# Patient Record
Sex: FEMALE | Race: WHITE | NOT HISPANIC OR LATINO | Employment: FULL TIME | URBAN - METROPOLITAN AREA
[De-identification: names, ages, dates, MRNs, and addresses within clinical notes are randomized per-mention and may not be internally consistent; named-entity substitution may affect disease eponyms.]

---

## 2019-10-17 ENCOUNTER — TRANSFERRED RECORDS (OUTPATIENT)
Dept: HEALTH INFORMATION MANAGEMENT | Facility: CLINIC | Age: 24
End: 2019-10-17

## 2020-11-06 ENCOUNTER — TRANSFERRED RECORDS (OUTPATIENT)
Dept: HEALTH INFORMATION MANAGEMENT | Facility: CLINIC | Age: 25
End: 2020-11-06

## 2021-08-31 ENCOUNTER — OFFICE VISIT (OUTPATIENT)
Dept: URGENT CARE | Facility: URGENT CARE | Age: 26
End: 2021-08-31
Payer: COMMERCIAL

## 2021-08-31 VITALS
TEMPERATURE: 98.7 F | OXYGEN SATURATION: 100 % | DIASTOLIC BLOOD PRESSURE: 80 MMHG | SYSTOLIC BLOOD PRESSURE: 143 MMHG | RESPIRATION RATE: 14 BRPM | HEART RATE: 78 BPM | WEIGHT: 145.2 LBS

## 2021-08-31 DIAGNOSIS — N93.9 VAGINAL SPOTTING: ICD-10-CM

## 2021-08-31 DIAGNOSIS — N89.8 VAGINAL DISCHARGE: Primary | ICD-10-CM

## 2021-08-31 DIAGNOSIS — N76.0 BACTERIAL VAGINITIS: ICD-10-CM

## 2021-08-31 DIAGNOSIS — B96.89 BACTERIAL VAGINITIS: ICD-10-CM

## 2021-08-31 LAB
ALBUMIN UR-MCNC: NEGATIVE MG/DL
APPEARANCE UR: CLEAR
BACTERIA #/AREA URNS HPF: ABNORMAL /HPF
BILIRUB UR QL STRIP: NEGATIVE
CLUE CELLS: PRESENT
COLOR UR AUTO: YELLOW
GLUCOSE UR STRIP-MCNC: NEGATIVE MG/DL
HCG UR QL: NEGATIVE
HGB UR QL STRIP: ABNORMAL
KETONES UR STRIP-MCNC: NEGATIVE MG/DL
LEUKOCYTE ESTERASE UR QL STRIP: NEGATIVE
NITRATE UR QL: NEGATIVE
PH UR STRIP: 6 [PH] (ref 5–7)
RBC #/AREA URNS AUTO: ABNORMAL /HPF
SP GR UR STRIP: >=1.03 (ref 1–1.03)
SQUAMOUS #/AREA URNS AUTO: ABNORMAL /LPF
TRICHOMONAS, WET PREP: ABNORMAL
UROBILINOGEN UR STRIP-ACNC: 0.2 E.U./DL
WBC #/AREA URNS AUTO: ABNORMAL /HPF
WBC'S/HIGH POWER FIELD, WET PREP: ABNORMAL
YEAST, WET PREP: ABNORMAL

## 2021-08-31 PROCEDURE — 81025 URINE PREGNANCY TEST: CPT | Performed by: NURSE PRACTITIONER

## 2021-08-31 PROCEDURE — 99203 OFFICE O/P NEW LOW 30 MIN: CPT | Performed by: NURSE PRACTITIONER

## 2021-08-31 PROCEDURE — 87210 SMEAR WET MOUNT SALINE/INK: CPT | Performed by: NURSE PRACTITIONER

## 2021-08-31 PROCEDURE — 81001 URINALYSIS AUTO W/SCOPE: CPT | Performed by: NURSE PRACTITIONER

## 2021-08-31 RX ORDER — LEVOTHYROXINE SODIUM 50 UG/1
50 TABLET ORAL
COMMUNITY
Start: 2021-07-28 | End: 2022-12-13

## 2021-08-31 RX ORDER — METRONIDAZOLE 500 MG/1
500 TABLET ORAL 2 TIMES DAILY
Qty: 14 TABLET | Refills: 0 | Status: SHIPPED | OUTPATIENT
Start: 2021-08-31 | End: 2021-09-07

## 2021-08-31 RX ORDER — NORGESTIMATE AND ETHINYL ESTRADIOL 7DAYSX3 28
0.04 KIT ORAL
COMMUNITY
Start: 2020-08-25

## 2021-08-31 RX ORDER — CITALOPRAM HYDROBROMIDE 20 MG/1
20 TABLET ORAL
COMMUNITY
Start: 2021-06-23 | End: 2024-01-15

## 2021-08-31 ASSESSMENT — ENCOUNTER SYMPTOMS
NAUSEA: 0
SHORTNESS OF BREATH: 0
COUGH: 0
SORE THROAT: 0
DIARRHEA: 0
RHINORRHEA: 0
CHILLS: 0
FEVER: 0
HEADACHES: 0
VOMITING: 0

## 2021-08-31 ASSESSMENT — PAIN SCALES - GENERAL: PAINLEVEL: MILD PAIN (2)

## 2021-08-31 NOTE — PROGRESS NOTES
SUBJECTIVE:   Mar Flores is a 25 year old female presenting with a chief complaint of   Chief Complaint   Patient presents with     Vaginal Problem     Patient has been having discharge, odor and has been having some bleeding after intrercourse. Has been ongoing for the past 2 weeks.        She is a new patient of [facility].    GYN Complaint    Onset of symptoms was 22 week(s) ago.  Course of illness is worsening.    Severity moderate  Current and Associated symptoms: vaginal discharge described as white and malodorous  Treatment measures tried include:  None  Sexually active: yes, single partner, contraception - mini pill / progesterone only pill  Predisposing factors: None  Hx of previous symptom: none    Had vaginal bleeding after sex.  And she has been having spotting.      Review of Systems   Constitutional: Negative for chills and fever.   HENT: Negative for congestion, ear pain, rhinorrhea and sore throat.    Respiratory: Negative for cough and shortness of breath.    Gastrointestinal: Negative for diarrhea, nausea and vomiting.   Genitourinary: Positive for vaginal bleeding and vaginal discharge.   Neurological: Negative for headaches.   All other systems reviewed and are negative.      No past medical history on file.  No family history on file.  Current Outpatient Medications   Medication Sig Dispense Refill     citalopram (CELEXA) 20 MG tablet Take 20 mg by mouth       levothyroxine (SYNTHROID/LEVOTHROID) 50 MCG tablet Take 50 mcg by mouth       metroNIDAZOLE (FLAGYL) 500 MG tablet Take 1 tablet (500 mg) by mouth 2 times daily for 7 days 14 tablet 0     norgestim-eth estrad triphasic (ORTHO TRI-CYCLEN) 0.18/0.215/0.25 MG-35 MCG tablet Take 0.035 mg by mouth       Social History     Tobacco Use     Smoking status: Never Smoker     Smokeless tobacco: Never Used   Substance Use Topics     Alcohol use: Not on file       OBJECTIVE  BP (!) 143/80   Pulse 78   Temp 98.7  F (37.1  C) (Tympanic)   Resp 14    Wt 65.9 kg (145 lb 3.2 oz)   SpO2 100%     Physical Exam  Vitals and nursing note reviewed.   Constitutional:       General: She is not in acute distress.     Appearance: She is well-developed. She is not diaphoretic.   HENT:      Head: Normocephalic and atraumatic.      Right Ear: Tympanic membrane and external ear normal.      Left Ear: Tympanic membrane and external ear normal.   Eyes:      Pupils: Pupils are equal, round, and reactive to light.   Pulmonary:      Effort: Pulmonary effort is normal. No respiratory distress.      Breath sounds: Normal breath sounds.   Abdominal:      Tenderness: There is no right CVA tenderness or left CVA tenderness.   Musculoskeletal:      Cervical back: Normal range of motion and neck supple.   Lymphadenopathy:      Cervical: No cervical adenopathy.   Skin:     General: Skin is warm and dry.   Neurological:      Mental Status: She is alert.      Cranial Nerves: No cranial nerve deficit.         Labs:  Results for orders placed or performed in visit on 08/31/21 (from the past 24 hour(s))   UA Macro with Reflex to Micro and Culture - lab collect    Specimen: Urine, Clean Catch   Result Value Ref Range    Color Urine Yellow Colorless, Straw, Light Yellow, Yellow    Appearance Urine Clear Clear    Glucose Urine Negative Negative mg/dL    Bilirubin Urine Negative Negative    Ketones Urine Negative Negative mg/dL    Specific Gravity Urine >=1.030 1.003 - 1.035    Blood Urine Large (A) Negative    pH Urine 6.0 5.0 - 7.0    Protein Albumin Urine Negative Negative mg/dL    Urobilinogen Urine 0.2 0.2, 1.0 E.U./dL    Nitrite Urine Negative Negative    Leukocyte Esterase Urine Negative Negative   Wet prep - lab collect    Specimen: Vagina; Swab   Result Value Ref Range    Trichomonas Absent Absent    Yeast Absent Absent    Clue Cells Present (A) Absent    WBCs/high power field 1+ (A) None   Urine Microscopic   Result Value Ref Range    Bacteria Urine Many (A) None Seen /HPF    RBC Urine  5-10 (A) 0-2 /HPF /HPF    WBC Urine 0-5 0-5 /HPF /HPF    Squamous Epithelials Urine Few (A) None Seen /LPF    Narrative    Urine Culture not indicated   ASSESSMENT:      ICD-10-CM    1. Vaginal discharge  N89.8 UA Macro with Reflex to Micro and Culture - lab collect     Wet prep - lab collect     UA Macro with Reflex to Micro and Culture - lab collect     Wet prep - lab collect     Urine Microscopic   2. Bacterial vaginitis  N76.0 metroNIDAZOLE (FLAGYL) 500 MG tablet    B96.89    3. Vaginal spotting  N93.9 HCG qualitative urine        PLAN:  I discussed lab results with the patient.Patient educational/instructional material provided including reasons for follow-up    The patient indicates understanding of these issues and agrees with the plan.              Patient Instructions     Patient Education     Bacterial Vaginosis    You have a vaginal infection called bacterial vaginosis (BV). Both good and bad bacteria are present in a healthy vagina. BV occurs when these bacteria get out of balance. The number of bad bacteria increase. And the number of good bacteria decrease. BV is linked with sexual activity, but it's not a sexually transmitted infection (STI).   BV may or may not cause symptoms. If symptoms do occur, they can include:     Thin, gray, milky-white, or sometimes green discharge    Unpleasant odor or  fishy  smell    Itching, burning, or pain in or around the vagina  It is not known what causes BV, but certain factors can make the problem more likely. These can include:     Douching    Spermicides    Use of antibiotics    Change in hormone levels with pregnancy, breastfeeding, or menopause    Having sex with a new partner    Having sex with more than one partner  BV will sometimes go away on its own. But treatment is often advised. This is because untreated BV can raise the risk of more serious health problems such as:     Pelvic inflammatory disease (PID)     delivery (giving birth to a baby early  if you re pregnant)    HIV and some other sexually transmitted infections (STIs)    Infection after surgery on the reproductive organs  Home care  General care    BV is most often treated with medicines called antibiotics. These may be given as pills or as a vaginal cream. If antibiotics are prescribed, be sure to use them exactly as directed. And complete all of the medicine, even if your symptoms go away.    Don't douche or having sex during treatment.    If you have sex with a female partner, ask your healthcare provider if she should also be treated.  Prevention    Don't douche.    Don't have sex. If you do have sex, then take steps to lower your risk:  ? Use condoms when having sex.  ? Limit the number of sex partners you have.    Follow-up care  Follow up with your healthcare provider, or as advised.   When to get medical advice  Call your healthcare provider right away if:     You have a fever of 100.4 F (38 C) or higher, or as directed by your provider.    Your symptoms get worse, or they don t go away within a few days of starting treatment.    You have new pain in the lower belly or pelvic region.    You have side effects that bother you or a reaction to the pills or cream you re prescribed.    You or any of your sex partners have new symptoms, such as a rash, joint pain, or sores.  [employer] last reviewed this educational content on 6/1/2020 2000-2021 [employer]. All rights reserved. This information is not intended as a substitute for professional medical care. Always follow your healthcare professional's instructions.

## 2021-09-01 NOTE — PATIENT INSTRUCTIONS
Patient Education     Bacterial Vaginosis    You have a vaginal infection called bacterial vaginosis (BV). Both good and bad bacteria are present in a healthy vagina. BV occurs when these bacteria get out of balance. The number of bad bacteria increase. And the number of good bacteria decrease. BV is linked with sexual activity, but it's not a sexually transmitted infection (STI).   BV may or may not cause symptoms. If symptoms do occur, they can include:     Thin, gray, milky-white, or sometimes green discharge    Unpleasant odor or  fishy  smell    Itching, burning, or pain in or around the vagina  It is not known what causes BV, but certain factors can make the problem more likely. These can include:     Douching    Spermicides    Use of antibiotics    Change in hormone levels with pregnancy, breastfeeding, or menopause    Having sex with a new partner    Having sex with more than one partner  BV will sometimes go away on its own. But treatment is often advised. This is because untreated BV can raise the risk of more serious health problems such as:     Pelvic inflammatory disease (PID)     delivery (giving birth to a baby early if you re pregnant)    HIV and some other sexually transmitted infections (STIs)    Infection after surgery on the reproductive organs  Home care  General care    BV is most often treated with medicines called antibiotics. These may be given as pills or as a vaginal cream. If antibiotics are prescribed, be sure to use them exactly as directed. And complete all of the medicine, even if your symptoms go away.    Don't douche or having sex during treatment.    If you have sex with a female partner, ask your healthcare provider if she should also be treated.  Prevention    Don't douche.    Don't have sex. If you do have sex, then take steps to lower your risk:  ? Use condoms when having sex.  ? Limit the number of sex partners you have.    Follow-up care  Follow up with your  healthcare provider, or as advised.   When to get medical advice  Call your healthcare provider right away if:     You have a fever of 100.4 F (38 C) or higher, or as directed by your provider.    Your symptoms get worse, or they don t go away within a few days of starting treatment.    You have new pain in the lower belly or pelvic region.    You have side effects that bother you or a reaction to the pills or cream you re prescribed.    You or any of your sex partners have new symptoms, such as a rash, joint pain, or sores.  [employer] last reviewed this educational content on 6/1/2020 2000-2021 [employer]. All rights reserved. This information is not intended as a substitute for professional medical care. Always follow your healthcare professional's instructions.

## 2022-08-08 ENCOUNTER — TELEPHONE (OUTPATIENT)
Dept: ENDOCRINOLOGY | Facility: CLINIC | Age: 27
End: 2022-08-08

## 2022-08-08 ENCOUNTER — MEDICAL CORRESPONDENCE (OUTPATIENT)
Dept: HEALTH INFORMATION MANAGEMENT | Facility: CLINIC | Age: 27
End: 2022-08-08

## 2022-08-08 NOTE — TELEPHONE ENCOUNTER
"I received an \"outside messages\" message today from a physician requesting an endocrinology new patient consult for hypothyroidism.  I don't know why it was sent directly to me.  Please offer patient the first available endocrinology new patient appointment, with whichever provider available.  Thanks.    ESTHER March MD, MS  Endocrinology  [facility]        "

## 2022-08-09 ENCOUNTER — TRANSCRIBE ORDERS (OUTPATIENT)
Dept: OTHER | Age: 27
End: 2022-08-09

## 2022-08-09 DIAGNOSIS — E06.9 THYROIDITIS: Primary | ICD-10-CM

## 2022-08-09 DIAGNOSIS — E06.3 HYPOTHYROIDISM DUE TO HASHIMOTO'S THYROIDITIS: ICD-10-CM

## 2022-12-13 ENCOUNTER — VIRTUAL VISIT (OUTPATIENT)
Dept: ENDOCRINOLOGY | Facility: CLINIC | Age: 27
End: 2022-12-13
Payer: COMMERCIAL

## 2022-12-13 DIAGNOSIS — E06.3 HYPOTHYROIDISM DUE TO HASHIMOTO'S THYROIDITIS: ICD-10-CM

## 2022-12-13 PROCEDURE — 99204 OFFICE O/P NEW MOD 45 MIN: CPT | Mod: 95 | Performed by: INTERNAL MEDICINE

## 2022-12-13 RX ORDER — LEVOTHYROXINE SODIUM 50 UG/1
50 TABLET ORAL DAILY
Qty: 30 TABLET | Refills: 0 | Status: SHIPPED | OUTPATIENT
Start: 2022-12-13 | End: 2022-12-24

## 2022-12-13 NOTE — LETTER
12/13/2022         RE: Mar Flores  [address]  [facility] [address]        Dear Colleague,    Thank you for referring your patient, Mar Flores, to the [facility] [place]. Please see a copy of my visit note below.    Patient is being evaluated via a billable video visit.      How would you like to obtain your AVS? Verbally Reviewed  If the video visit is dropped, the invitation should be resent by: Cellphone  Will anyone else be joining your video visit? No        Video-Visit Details    Video Start Time: 2:07 pm    Type of service:  Video Visit    Video End Time:  2:28 pm    Originating Location (pt. Location):  Home    PROVIDER LOCATION On-site vs Off-site    Distant Location (provider location):  Home    Platform used for Video Visit: [email or web address]          Name: Mar Flores is a 27 year old woman, referred by Dr. Abdirizak Up for evaluation of     Chief Complaint   Patient presents with     Thyroid Problem       HPI:  Recent issues:  Here for evaluation of hypothyroidism problem  Reviewed medical history from patient and [facility] chart record        10/2015. Initial diagnosis thyroid problem while in high school  Symptoms of joint pains, wt gain, hair thinning  She avoided gluten foods but symptoms persisted  Patient recalls lab tests revealed abnormal thyroid levels  Thyroid U/S reportedly showed increased vascularity pattern, TgAb positive and TPOAb negative  Began levothyroxine medication taking levothyroxine 0.05 mg daily    Previous endocrinology evaluations included [facility]va, and [facility] in [place], [place]  2018. Moved from [facility] to [facility]    Additional health history:  Neck radiation treatment:  Fam Hx thyroid disease: [place]- hyperthyroidism, cousin- hypothyroidism    Previous  labs include:      Previous [facility] labs include:      Current dose:  Levothyroxine 0.05 mg daily    Other chronic illnesses include:   Asthma:    Followed by PCP   Depression:    Followed by PCP   Left hip pain:  Followed by PCP      Lives in [place], [place]  Sees Dr. Zena Luther/[facility] for general medicine evaluations.    PMH/PSH:  Past Medical History:   Diagnosis Date     Acne      Anxiety      Depression      Exercise-induced asthma      Gluten intolerance      Hip pain, left      Thyroiditis      No past surgical history on file.    Family Hx:  No family history on file.      Social Hx:  Social History     Socioeconomic History     Marital status: Single     Spouse name: Not on file     Number of children: Not on file     Years of education: Not on file     Highest education level: Not on file   Occupational History     Not on file   Tobacco Use     Smoking status: Never     Smokeless tobacco: Never   Substance and Sexual Activity     Alcohol use: Not on file     Drug use: Not on file     Sexual activity: Not on file   Other Topics Concern     Not on file   Social History Narrative     Not on file     Social Determinants of Health     Financial Resource Strain: Not on file   Food Insecurity: Not on file   Transportation Needs: Not on file   Physical Activity: Not on file   Stress: Not on file   Social Connections: Not on file   Intimate Partner Violence: Not on file   Housing Stability: Not on file          MEDICATIONS:  has a current medication list which includes the following prescription(s): citalopram, levothyroxine, and norgestim-eth estrad triphasic.    ROS:     ROS: 10 point ROS neg other than the symptoms noted above in the HPI.    GENERAL: no fatigue, wt stable; denies fevers, chills, night sweats.   HEENT: no dysphagia, odonophagia, diplopia, neck pain  THYROID:  no apparent hyper or hypothyroid symptoms  CV: no chest pain, pressure, palpitations  LUNGS: no SOB, ZIMMER, cough, wheezing   ABDOMEN: occasional indigestion; no diarrhea, constipation, abdominal pain  EXTREMITIES: no rashes, ulcers, edema  NEUROLOGY: no  headaches, denies changes in vision, tingling, extremitiy numbness   MSK: no muscle aches or pains, weakness  SKIN: no rashes or lesions  : menses regular, recently stopped OCP medication 11/2022  PSYCH:  stable mood, no significant anxiety or depression  ENDOCRINE: no heat or cold intolerance    Physical Exam (visual exam)  VS:  no vital signs taken for video visit  CONSTITUTIONAL: healthy, alert and NAD, well dressed, answering questions appropriately  ENT: no nose swelling or nasal discharge, mouth redness or gum changes.  EYES: eyes grossly normal to inspection, conjunctivae and sclerae normal, no exophthalmos or proptosis  THYROID:  no apparent nodules or goiter  LUNGS: no audible wheeze, cough or visible cyanosis, no visible retractions or increased work of breathing  ABDOMEN: abdomen not evaluated  EXTREMITIES: no hand tremors, limited exam  NEUROLOGY: CN grossly intact, mentation intact and speech normal   SKIN:  no apparent skin lesions, rash, or edema with visualized skin appearance  PSYCH: mentation appears normal, affect normal/bright, judgement and insight intact,   normal speech and appearance well groomed      LABS:    All pertinent notes, labs, and images personally reviewed by me.     A/P:  Encounter Diagnosis   Name Primary?     Hypothyroidism due to Hashimoto's thyroiditis        Comments:  Reviewed health history and thyroid issues.  History indicates elevated TgAb though normal TPOAb, probable autoimmune Hashimoto's syndrome    Plan:  Reviewed general issues with the hypothyroidism diagnosis and management  Discussed lab tests used to assess patient thyroid hormone levels  Reviewed thyroid medication treatment with levothyroxine medication, dosing.    Recommend:  Continue current levothyroxine 0.05 mg daily dose at this time  Monitor for symptom changes  Arrange repeat thyroid lab tests soon   Discussed options at [facility]   Consider having new PCP order tests and results  sent here   Lab orders placed  No thyroid U/S imaging needed at this time  Keep focus on diet, exercise, and weight management  Reviewed general issues for thyroid lab testing and levothyroxine treatment if Pg  Contact me if questions about this management plan     Discussed importance of having a primary care practitioner (FP or Int Med) for general medicine appointments and also acute care visits.  Addressed patient questions today     There are no Patient Instructions on file for this visit.    Future labs ordered today:   Orders Placed This Encounter   Procedures     TSH     T4 free     Thyroid peroxidase antibody     Radiology/Consults ordered today:     Total time spent on day of encounter:  45 min    Follow-up:  12/2023    ESTHER March MD, MS  Endocrinology  [facility]    CC: Dr. Abdirizak Up/[facility]      Again, thank you for allowing me to participate in the care of your patient.        Sincerely,        Yomi March MD

## 2022-12-13 NOTE — PROGRESS NOTES
Patient is being evaluated via a billable video visit.      How would you like to obtain your AVS? Verbally Reviewed  If the video visit is dropped, the invitation should be resent by: Cellphone  Will anyone else be joining your video visit? No        Video-Visit Details    Video Start Time: 2:07 pm    Type of service:  Video Visit    Video End Time:  2:28 pm    Originating Location (pt. Location):  Home    PROVIDER LOCATION On-site vs Off-site    Distant Location (provider location):  Home    Platform used for Video Visit: [facility]          Name: Mar Flores is a 27 year old woman, referred by Dr. Abdirizak Up for evaluation of     Chief Complaint   Patient presents with     Thyroid Problem       HPI:  Recent issues:  Here for evaluation of hypothyroidism problem  Reviewed medical history from patient and Epic chart record        10/2015. Initial diagnosis thyroid problem while in high school  Symptoms of joint pains, wt gain, hair thinning  She avoided gluten foods but symptoms persisted  Patient recalls lab tests revealed abnormal thyroid levels  Thyroid U/S reportedly showed increased vascularity pattern, TgAb positive and TPOAb negative  Began levothyroxine medication taking levothyroxine 0.05 mg daily    Previous endocrinology evaluations included Lashon Alva, and Jeovanny in [place], [place]  2018. Moved from [facility] to [facility]    Additional health history:  Neck radiation treatment:  Fam Hx thyroid disease: MA- hyperthyroidism, cousin- hypothyroidism    Previous  labs include:      Previous [facility] labs include:      Current dose:  Levothyroxine 0.05 mg daily    Other chronic illnesses include:   Asthma:   Followed by PCP   Depression:    Followed by PCP   Left hip pain:  Followed by PCP      Lives in [place], [place]  Sees Dr. Zena Luther/[facility] for general medicine evaluations.    PMH/PSH:  Past Medical History:   Diagnosis Date     Acne       Anxiety      Depression      Exercise-induced asthma      Gluten intolerance      Hip pain, left      Thyroiditis      No past surgical history on file.    Family Hx:  No family history on file.      Social Hx:  Social History     Socioeconomic History     Marital status: Single     Spouse name: Not on file     Number of children: Not on file     Years of education: Not on file     Highest education level: Not on file   Occupational History     Not on file   Tobacco Use     Smoking status: Never     Smokeless tobacco: Never   Substance and Sexual Activity     Alcohol use: Not on file     Drug use: Not on file     Sexual activity: Not on file   Other Topics Concern     Not on file   Social History Narrative     Not on file     Social Determinants of Health     Financial Resource Strain: Not on file   Food Insecurity: Not on file   Transportation Needs: Not on file   Physical Activity: Not on file   Stress: Not on file   Social Connections: Not on file   Intimate Partner Violence: Not on file   Housing Stability: Not on file          MEDICATIONS:  has a current medication list which includes the following prescription(s): citalopram, levothyroxine, and norgestim-eth estrad triphasic.    ROS:     ROS: 10 point ROS neg other than the symptoms noted above in the HPI.    GENERAL: no fatigue, wt stable; denies fevers, chills, night sweats.   HEENT: no dysphagia, odonophagia, diplopia, neck pain  THYROID:  no apparent hyper or hypothyroid symptoms  CV: no chest pain, pressure, palpitations  LUNGS: no SOB, ZIMMER, cough, wheezing   ABDOMEN: occasional indigestion; no diarrhea, constipation, abdominal pain  EXTREMITIES: no rashes, ulcers, edema  NEUROLOGY: no headaches, denies changes in vision, tingling, extremitiy numbness   MSK: no muscle aches or pains, weakness  SKIN: no rashes or lesions  : menses regular, recently stopped OCP medication 11/2022  PSYCH:  stable mood, no significant anxiety or depression  ENDOCRINE:  no heat or cold intolerance    Physical Exam (visual exam)  VS:  no vital signs taken for video visit  CONSTITUTIONAL: healthy, alert and NAD, well dressed, answering questions appropriately  ENT: no nose swelling or nasal discharge, mouth redness or gum changes.  EYES: eyes grossly normal to inspection, conjunctivae and sclerae normal, no exophthalmos or proptosis  THYROID:  no apparent nodules or goiter  LUNGS: no audible wheeze, cough or visible cyanosis, no visible retractions or increased work of breathing  ABDOMEN: abdomen not evaluated  EXTREMITIES: no hand tremors, limited exam  NEUROLOGY: CN grossly intact, mentation intact and speech normal   SKIN:  no apparent skin lesions, rash, or edema with visualized skin appearance  PSYCH: mentation appears normal, affect normal/bright, judgement and insight intact,   normal speech and appearance well groomed      LABS:    All pertinent notes, labs, and images personally reviewed by me.     A/P:  Encounter Diagnosis   Name Primary?     Hypothyroidism due to Hashimoto's thyroiditis        Comments:  Reviewed health history and thyroid issues.  History indicates elevated TgAb though normal TPOAb, probable autoimmune Hashimoto's syndrome    Plan:  Reviewed general issues with the hypothyroidism diagnosis and management  Discussed lab tests used to assess patient thyroid hormone levels  Reviewed thyroid medication treatment with levothyroxine medication, dosing.    Recommend:  Continue current levothyroxine 0.05 mg daily dose at this time  Monitor for symptom changes  Arrange repeat thyroid lab tests soon   Discussed options at [facility] [place] clinics   Consider having new PCP order tests and results sent here   Lab orders placed  No thyroid U/S imaging needed at this time  Keep focus on diet, exercise, and weight management  Reviewed general issues for thyroid lab testing and levothyroxine treatment if Pg  Contact me if questions about this management plan      Discussed importance of having a primary care practitioner (FP or Int Med) for general medicine appointments and also acute care visits.  Addressed patient questions today     There are no Patient Instructions on file for this visit.    Future labs ordered today:   Orders Placed This Encounter   Procedures     TSH     T4 free     Thyroid peroxidase antibody     Radiology/Consults ordered today:     Total time spent on day of encounter:  45 min    Follow-up:  12/2023    ESTHER March MD, MS  Endocrinology  [facility]    CC: Dr. Abdirizak Up/[facility]

## 2022-12-17 ENCOUNTER — LAB (OUTPATIENT)
Dept: LAB | Facility: CLINIC | Age: 27
End: 2022-12-17
Payer: COMMERCIAL

## 2022-12-17 DIAGNOSIS — E06.3 HYPOTHYROIDISM DUE TO HASHIMOTO'S THYROIDITIS: ICD-10-CM

## 2022-12-17 LAB
T4 FREE SERPL-MCNC: 1.36 NG/DL (ref 0.9–1.7)
TSH SERPL DL<=0.005 MIU/L-ACNC: 2.17 UIU/ML (ref 0.3–4.2)

## 2022-12-17 PROCEDURE — 86376 MICROSOMAL ANTIBODY EACH: CPT

## 2022-12-17 PROCEDURE — 84439 ASSAY OF FREE THYROXINE: CPT

## 2022-12-17 PROCEDURE — 36415 COLL VENOUS BLD VENIPUNCTURE: CPT

## 2022-12-17 PROCEDURE — 84443 ASSAY THYROID STIM HORMONE: CPT

## 2022-12-19 LAB — THYROPEROXIDASE AB SERPL-ACNC: <10 IU/ML

## 2022-12-24 DIAGNOSIS — E06.3 HYPOTHYROIDISM DUE TO HASHIMOTO'S THYROIDITIS: ICD-10-CM

## 2022-12-24 RX ORDER — LEVOTHYROXINE SODIUM 50 UG/1
50 TABLET ORAL DAILY
Qty: 90 TABLET | Refills: 3 | Status: SHIPPED | OUTPATIENT
Start: 2022-12-24 | End: 2024-01-15

## 2023-02-11 ENCOUNTER — HEALTH MAINTENANCE LETTER (OUTPATIENT)
Age: 28
End: 2023-02-11

## 2023-05-20 ENCOUNTER — HEALTH MAINTENANCE LETTER (OUTPATIENT)
Age: 28
End: 2023-05-20

## 2024-01-15 ENCOUNTER — VIRTUAL VISIT (OUTPATIENT)
Dept: ENDOCRINOLOGY | Facility: CLINIC | Age: 29
End: 2024-01-15
Payer: COMMERCIAL

## 2024-01-15 DIAGNOSIS — E06.3 HYPOTHYROIDISM DUE TO HASHIMOTO'S THYROIDITIS: Primary | ICD-10-CM

## 2024-01-15 PROCEDURE — 99213 OFFICE O/P EST LOW 20 MIN: CPT | Mod: 95 | Performed by: INTERNAL MEDICINE

## 2024-01-15 RX ORDER — LEVOTHYROXINE SODIUM 50 UG/1
50 TABLET ORAL DAILY
Qty: 90 TABLET | Refills: 0 | Status: SHIPPED | OUTPATIENT
Start: 2024-01-15 | End: 2024-01-19

## 2024-01-15 NOTE — Clinical Note
"    1/15/2024         RE: Mar Wei  [address] [address]        Dear Colleague,    Thank you for referring your patient, Mar Wei, to the [facility] [place]. Please see a copy of my visit note below.    Virtual Visit Details    Type of service:  Video Visit     Originating Location (pt. Location): {video visit patient location:347036::\"Home\"}  {PROVIDER LOCATION On-site should be selected for visits conducted from your clinic location or adjoining [facility] hospital, academic office, or other nearby [facility] building. Off-site should be selected for all other provider locations, including home:224895}  Distant Location (provider location):  {virtual location provider:037224}  Platform used for Video Visit: {Virtual Visit Platforms:855381::\"Planex\"}      Recent issues:  Hypothyroidism followup evaluation, last/first appt with me 12/13/22  Reviewed medical history from patient and Epic chart record        10/2015. Initial diagnosis thyroid problem while in high school  Symptoms of joint pains, wt gain, hair thinning  She avoided gluten foods but symptoms persisted  Patient recalls lab tests revealed abnormal thyroid levels  Thyroid U/S reportedly showed increased vascularity pattern, TgAb positive and TPOAb negative  Began levothyroxine medication taking levothyroxine 0.05 mg daily    Previous endocrinology evaluations included Lashon Alva, and Jeovanny in [place], [place]  2018. Moved from [facility] to [facility]    Additional health history:  Neck radiation treatment:  Fam Hx thyroid disease: MA- hyperthyroidism, cousin- hypothyroidism    Previous  labs include:      Previous [facility] labs include:          12/13/22. Initial endocrinology evaluation with me at [place]  Reviewed health history and thyroid issues.  Continued levothyroxine medication treatment  Recent [facility] labs include:  Lab Results   Component Value Date    TSH 2.17 12/17/2022    T4 1.36 " 12/17/2022    TPO <10 12/17/2022     Current dose:  Levothyroxine 0.05 mg daily          Lives in [place], [place]  Sees Dr. Zena Luther/[facility] for general medicine evaluations.    PMH/PSH:  Past Medical History:   Diagnosis Date    Acne     Anxiety     Depression     Exercise-induced asthma     Gluten intolerance     Hip pain, left     Thyroiditis      No past surgical history on file.    Family Hx:  No family history on file.      Social Hx:  Social History     Socioeconomic History    Marital status: Single     Spouse name: Not on file    Number of children: Not on file    Years of education: Not on file    Highest education level: Not on file   Occupational History    Not on file   Tobacco Use    Smoking status: Never    Smokeless tobacco: Never   Substance and Sexual Activity    Alcohol use: Not on file    Drug use: Not on file    Sexual activity: Not on file   Other Topics Concern    Not on file   Social History Narrative    Not on file     Social Determinants of Health     Financial Resource Strain: Not on file   Food Insecurity: Not on file   Transportation Needs: Not on file   Physical Activity: Not on file   Stress: Not on file   Social Connections: Not on file   Interpersonal Safety: Not on file   Housing Stability: Not on file          MEDICATIONS:  has a current medication list which includes the following prescription(s): citalopram, levothyroxine, and norgestim-eth estrad triphasic.    ROS:     ROS: 10 point ROS neg other than the symptoms noted above in the HPI.    GENERAL: no fatigue, wt stable; denies fevers, chills, night sweats.   HEENT: no dysphagia, odonophagia, diplopia, neck pain  THYROID:  no apparent hyper or hypothyroid symptoms  CV: no chest pain, pressure, palpitations  LUNGS: no SOB, ZIMMER, cough, wheezing   ABDOMEN: occasional indigestion; no diarrhea, constipation, abdominal pain  EXTREMITIES: no rashes, ulcers, edema  NEUROLOGY: no headaches, denies changes in  vision, tingling, extremitiy numbness   MSK: no muscle aches or pains, weakness  SKIN: no rashes or lesions  : menses regular, recently stopped OCP medication 11/2022  PSYCH:  stable mood, no significant anxiety or depression  ENDOCRINE: no heat or cold intolerance    Physical Exam (visual exam)  VS:  no vital signs taken for video visit  CONSTITUTIONAL: healthy, alert and NAD, well dressed, answering questions appropriately  ENT: no nose swelling or nasal discharge, mouth redness or gum changes.  EYES: eyes grossly normal to inspection, conjunctivae and sclerae normal, no exophthalmos or proptosis  THYROID:  no apparent nodules or goiter  LUNGS: no audible wheeze, cough or visible cyanosis, no visible retractions or increased work of breathing  ABDOMEN: abdomen not evaluated  EXTREMITIES: no hand tremors, limited exam  NEUROLOGY: CN grossly intact, mentation intact and speech normal   SKIN:  no apparent skin lesions, rash, or edema with visualized skin appearance  PSYCH: mentation appears normal, affect normal/bright, judgement and insight intact,   normal speech and appearance well groomed      LABS:    All pertinent notes, labs, and images personally reviewed by me.     A/P:  No diagnosis found.      Comments:  Reviewed health history and thyroid issues.  History indicates elevated TgAb though normal TPOAb, probable autoimmune Hashimoto's syndrome    Plan:  Reviewed general issues with the hypothyroidism diagnosis and management  Discussed lab tests used to assess patient thyroid hormone levels  Reviewed thyroid medication treatment with levothyroxine medication, dosing.    Recommend:  Continue current levothyroxine 0.05 mg daily dose at this time  Monitor for symptom changes  Arrange repeat thyroid lab tests soon   Discussed options at [facility] [place] [place] clinics   Consider having new PCP order tests and results sent here   Lab orders placed  No thyroid U/S imaging needed at this time  Keep focus on diet,  exercise, and weight management  Reviewed general issues for thyroid lab testing and levothyroxine treatment if Pg  Contact me if questions about this management plan     Discussed importance of having a primary care practitioner (FP or Int Med) for general medicine appointments and also acute care visits.  Addressed patient questions today     There are no Patient Instructions on file for this visit.    Future labs ordered today:   No orders of the defined types were placed in this encounter.    Radiology/Consults ordered today:     Total time spent on day of encounter:  45 min    Follow-up:  12/2023    ESTHER March MD, MS  Endocrinology  [facility]    CC: Dr. Abdirizak Up/[facility]    Virtual Visit Details    Type of service:  Video Visit     Originating Location (pt. Location): Home  Distant Location (provider location):  Off-site  Platform used for Video Visit: [employer]      Recent issues:  Hypothyroidism followup evaluation, last/first appt with me 12/13/22  Feeling well overall, no new health issues reported.  Taking the levothyroxine medication regularly, but no recent thyroid lab testing  Reviewed medical history from patient and Epic chart record        10/2015. Initial diagnosis thyroid problem while in high school  Symptoms of joint pains, wt gain, hair thinning  She avoided gluten foods but symptoms persisted  Patient recalls lab tests revealed abnormal thyroid levels  Thyroid U/S reportedly showed increased vascularity pattern, TgAb positive and TPOAb negative  Began levothyroxine medication taking levothyroxine 0.05 mg daily    Previous endocrinology evaluations included Lashon Alva, and Jeovanny in [place], [place]  2018. Moved from [facility] to [facility]    Additional health history:  Neck radiation treatment:  Fam Hx thyroid disease: MA- hyperthyroidism, cousin- hypothyroidism    Previous [facility] labs include:      Previous [facility] labs  include:          12/13/22. Initial endocrinology evaluation with me at [place]  Reviewed health history and thyroid issues.  Continued levothyroxine medication treatment  Recent [facility] labs include:  Lab Results   Component Value Date    TSH 2.17 12/17/2022    T4 1.36 12/17/2022    TPO <10 12/17/2022     Current dose:  Levothyroxine 0.05 mg daily      Lives in [place], [place]  Sees Dr. Zena Luther/[facility] for general medicine evaluations.    PMH/PSH:  Past Medical History:   Diagnosis Date     Acne      Anxiety      Depression      Exercise-induced asthma      Gluten intolerance      Hip pain, left      Thyroiditis      No past surgical history on file.    Family Hx:  No family history on file.      Social Hx:  Social History     Socioeconomic History     Marital status:      Spouse name: Not on file     Number of children: Not on file     Years of education: Not on file     Highest education level: Not on file   Occupational History     Not on file   Tobacco Use     Smoking status: Never     Smokeless tobacco: Never   Substance and Sexual Activity     Alcohol use: Not on file     Drug use: Not on file     Sexual activity: Not on file   Other Topics Concern     Not on file   Social History Narrative     Not on file     Social Determinants of Health     Financial Resource Strain: Not on file   Food Insecurity: Not on file   Transportation Needs: Not on file   Physical Activity: Not on file   Stress: Not on file   Social Connections: Not on file   Interpersonal Safety: Not on file   Housing Stability: Not on file          MEDICATIONS:  has a current medication list which includes the following prescription(s): levothyroxine and norgestim-eth estrad triphasic.    ROS:     ROS: 10 point ROS neg other than the symptoms noted above in the HPI.    GENERAL: no fatigue, wt stable; denies fevers, chills, night sweats.   HEENT: no dysphagia, odonophagia, diplopia, neck pain  THYROID:  no apparent  hyper or hypothyroid symptoms  CV: rare heart racing; no chest pain, pressure, palpitations  LUNGS: no SOB, ZIMMER, cough, wheezing   ABDOMEN: no diarrhea, constipation, abdominal pain  EXTREMITIES: no rashes, ulcers, edema  NEUROLOGY: no headaches, denies changes in vision, tingling, extremitiy numbness   MSK: no muscle aches or pains, weakness  SKIN: no rashes or lesions  : menses fairly regular but lighter, on OCP medication  PSYCH:  stable mood, no significant anxiety or depression  ENDOCRINE: no heat or cold intolerance    Physical Exam (visual exam)  VS:  no vital signs taken for video visit  CONSTITUTIONAL: healthy, alert and NAD, well dressed, answering questions appropriately  ENT: no nose swelling or nasal discharge, mouth redness or gum changes.  EYES: eyes grossly normal to inspection, conjunctivae and sclerae normal, no exophthalmos or proptosis  THYROID:  no apparent nodules or goiter  LUNGS: no audible wheeze, cough or visible cyanosis, no visible retractions or increased work of breathing  ABDOMEN: abdomen not evaluated  EXTREMITIES: no hand tremors, limited exam  NEUROLOGY: CN grossly intact, mentation intact and speech normal   SKIN:  no apparent skin lesions, rash, or edema with visualized skin appearance  PSYCH: mentation appears normal, affect normal/bright, judgement and insight intact,   normal speech and appearance well groomed      LABS:    All pertinent notes, labs, and images personally reviewed by me.     A/P:  Encounter Diagnosis   Name Primary?     Hypothyroidism due to Hashimoto's thyroiditis Yes     Comments:  Reviewed health history and thyroid issues.  History indicates elevated TgAb though normal TPOAb, probable autoimmune Hashimoto's syndrome  Need repeat (TSH) thyroid test info  Reviewed and interpreted tests that I previously ordered.   Ordered appropriate tests for the endocrinology disease management.    Management options discussed and implemented after shared medical decision  making with the patient.  Thyroid problem is chronic-stable     Plan:  Reviewed general issues with the hypothyroidism diagnosis and management  Discussed lab tests used to assess patient thyroid hormone levels  Reviewed thyroid medication treatment with levothyroxine medication, dosing.    Recommend:  Continue current levothyroxine 0.05 mg daily dose at this time  Sent updated #90, R0 Rx to pharmacy  Monitor for symptom changes  Arrange repeat thyroid lab tests soon   Discussed options at her [facility] or nearest [facility] clinic   Consider having new PCP order tests and results sent here  No thyroid U/S imaging needed at this time  Keep focus on diet, exercise, and weight management  Reviewed general issues for thyroid lab testing and levothyroxine treatment if Pg  Contact me if questions about this management plan     We have discussed importance of having a primary care practitioner for general medicine appointments and also acute care visits.  Addressed patient questions today     There are no Patient Instructions on file for this visit.    Future labs ordered today:   No orders of the defined types were placed in this encounter.    Radiology/Consults ordered today:     Total time spent on day of encounter:  16 min    Follow-up:  1/2025, Return    ESTHER March MD, MS  Endocrinology  [facility]          Again, thank you for allowing me to participate in the care of your patient.        Sincerely,        Yomi March MD

## 2024-01-15 NOTE — PROGRESS NOTES
Virtual Visit Details    Type of service:  Video Visit     Originating Location (pt. Location): Home  Distant Location (provider location):  Off-site  Platform used for Video Visit: [place]      Recent issues:  Hypothyroidism followup evaluation, last/first appt with me 12/13/22  Feeling well overall, no new health issues reported.  Taking the levothyroxine medication regularly, but no recent thyroid lab testing  Reviewed medical history from patient and Epic chart record        10/2015. Initial diagnosis thyroid problem while in high school  Symptoms of joint pains, wt gain, hair thinning  She avoided gluten foods but symptoms persisted  Patient recalls lab tests revealed abnormal thyroid levels  Thyroid U/S reportedly showed increased vascularity pattern, TgAb positive and TPOAb negative  Began levothyroxine medication taking levothyroxine 0.05 mg daily    Previous endocrinology evaluations included [facility]va, and [facility] in [place], [place]  2018. Moved from [facility] to [facility]    Additional health history:  Neck radiation treatment:  Fam Hx thyroid disease: MA- hyperthyroidism, cousin- hypothyroidism    Previous  labs include:      Previous [facility] labs include:          12/13/22. Initial endocrinology evaluation with me at [place]  Reviewed health history and thyroid issues.  Continued levothyroxine medication treatment  Recent [facility] labs include:  Lab Results   Component Value Date    TSH 2.17 12/17/2022    T4 1.36 12/17/2022    TPO <10 12/17/2022     Current dose:  Levothyroxine 0.05 mg daily      Lives in [place], [place]  Sees Dr. Zena Luther/[facility] for general medicine evaluations.    PMH/PSH:  Past Medical History:   Diagnosis Date    Acne     Anxiety     Depression     Exercise-induced asthma     Gluten intolerance     Hip pain, left     Thyroiditis      No past surgical history on file.    Family Hx:  No family history on file.      Social Hx:  Social  History     Socioeconomic History    Marital status:      Spouse name: Not on file    Number of children: Not on file    Years of education: Not on file    Highest education level: Not on file   Occupational History    Not on file   Tobacco Use    Smoking status: Never    Smokeless tobacco: Never   Substance and Sexual Activity    Alcohol use: Not on file    Drug use: Not on file    Sexual activity: Not on file   Other Topics Concern    Not on file   Social History Narrative    Not on file     Social Determinants of Health     Financial Resource Strain: Not on file   Food Insecurity: Not on file   Transportation Needs: Not on file   Physical Activity: Not on file   Stress: Not on file   Social Connections: Not on file   Interpersonal Safety: Not on file   Housing Stability: Not on file          MEDICATIONS:  has a current medication list which includes the following prescription(s): levothyroxine and norgestim-eth estrad triphasic.    ROS:     ROS: 10 point ROS neg other than the symptoms noted above in the HPI.    GENERAL: no fatigue, wt stable; denies fevers, chills, night sweats.   HEENT: no dysphagia, odonophagia, diplopia, neck pain  THYROID:  no apparent hyper or hypothyroid symptoms  CV: rare heart racing; no chest pain, pressure, palpitations  LUNGS: no SOB, ZIMMER, cough, wheezing   ABDOMEN: no diarrhea, constipation, abdominal pain  EXTREMITIES: no rashes, ulcers, edema  NEUROLOGY: no headaches, denies changes in vision, tingling, extremitiy numbness   MSK: no muscle aches or pains, weakness  SKIN: no rashes or lesions  : menses fairly regular but lighter, on OCP medication  PSYCH:  stable mood, no significant anxiety or depression  ENDOCRINE: no heat or cold intolerance    Physical Exam (visual exam)  VS:  no vital signs taken for video visit  CONSTITUTIONAL: healthy, alert and NAD, well dressed, answering questions appropriately  ENT: no nose swelling or nasal discharge, mouth redness or gum  changes.  EYES: eyes grossly normal to inspection, conjunctivae and sclerae normal, no exophthalmos or proptosis  THYROID:  no apparent nodules or goiter  LUNGS: no audible wheeze, cough or visible cyanosis, no visible retractions or increased work of breathing  ABDOMEN: abdomen not evaluated  EXTREMITIES: no hand tremors, limited exam  NEUROLOGY: CN grossly intact, mentation intact and speech normal   SKIN:  no apparent skin lesions, rash, or edema with visualized skin appearance  PSYCH: mentation appears normal, affect normal/bright, judgement and insight intact,   normal speech and appearance well groomed      LABS:    All pertinent notes, labs, and images personally reviewed by me.     A/P:  Encounter Diagnosis   Name Primary?    Hypothyroidism due to Hashimoto's thyroiditis Yes     Comments:  Reviewed health history and thyroid issues.  History indicates elevated TgAb though normal TPOAb, probable autoimmune Hashimoto's syndrome  Need repeat (TSH) thyroid test info  Reviewed and interpreted tests that I previously ordered.   Ordered appropriate tests for the endocrinology disease management.    Management options discussed and implemented after shared medical decision making with the patient.  Thyroid problem is chronic-stable     Plan:  Reviewed general issues with the hypothyroidism diagnosis and management  Discussed lab tests used to assess patient thyroid hormone levels  Reviewed thyroid medication treatment with levothyroxine medication, dosing.    Recommend:  Continue current levothyroxine 0.05 mg daily dose at this time  Sent updated #90, R0 Rx to pharmacy  Monitor for symptom changes  Arrange repeat thyroid lab tests soon   Discussed options at her [facility] or nearest [facility] clinic   Consider having new PCP order tests and results sent here  No thyroid U/S imaging needed at this time  Keep focus on diet, exercise, and weight management  Reviewed general issues for thyroid lab testing and  levothyroxine treatment if Pg  Contact me if questions about this management plan     We have discussed importance of having a primary care practitioner for general medicine appointments and also acute care visits.  Addressed patient questions today     There are no Patient Instructions on file for this visit.    Future labs ordered today:   No orders of the defined types were placed in this encounter.    Radiology/Consults ordered today:     Total time spent on day of encounter:  16 min    Follow-up:  1/2025, Return    ESTHER March MD, MS  Endocrinology  [facility]

## 2024-01-19 ENCOUNTER — MYC MEDICAL ADVICE (OUTPATIENT)
Dept: ENDOCRINOLOGY | Facility: CLINIC | Age: 29
End: 2024-01-19
Payer: COMMERCIAL

## 2024-01-19 DIAGNOSIS — E06.3 HYPOTHYROIDISM DUE TO HASHIMOTO'S THYROIDITIS: ICD-10-CM

## 2024-01-19 RX ORDER — LEVOTHYROXINE SODIUM 50 UG/1
50 TABLET ORAL DAILY
Qty: 90 TABLET | Refills: 3 | Status: SHIPPED | OUTPATIENT
Start: 2024-01-19

## 2024-07-27 ENCOUNTER — HEALTH MAINTENANCE LETTER (OUTPATIENT)
Age: 29
End: 2024-07-27

## 2025-08-10 ENCOUNTER — HEALTH MAINTENANCE LETTER (OUTPATIENT)
Age: 30
End: 2025-08-10